# Patient Record
Sex: MALE
[De-identification: names, ages, dates, MRNs, and addresses within clinical notes are randomized per-mention and may not be internally consistent; named-entity substitution may affect disease eponyms.]

---

## 2024-02-02 PROBLEM — Z00.00 ENCOUNTER FOR PREVENTIVE HEALTH EXAMINATION: Status: ACTIVE | Noted: 2024-02-02

## 2024-02-09 ENCOUNTER — NON-APPOINTMENT (OUTPATIENT)
Age: 84
End: 2024-02-09

## 2024-02-09 ENCOUNTER — APPOINTMENT (OUTPATIENT)
Dept: NEUROSURGERY | Facility: CLINIC | Age: 84
End: 2024-02-09
Payer: MEDICARE

## 2024-02-09 VITALS
BODY MASS INDEX: 23.95 KG/M2 | HEART RATE: 67 BPM | SYSTOLIC BLOOD PRESSURE: 135 MMHG | HEIGHT: 66 IN | DIASTOLIC BLOOD PRESSURE: 71 MMHG | OXYGEN SATURATION: 100 % | WEIGHT: 149 LBS

## 2024-02-09 DIAGNOSIS — Z86.39 PERSONAL HISTORY OF OTHER ENDOCRINE, NUTRITIONAL AND METABOLIC DISEASE: ICD-10-CM

## 2024-02-09 DIAGNOSIS — Z86.69 PERSONAL HISTORY OF OTHER DISEASES OF THE NERVOUS SYSTEM AND SENSE ORGANS: ICD-10-CM

## 2024-02-09 DIAGNOSIS — Z86.79 PERSONAL HISTORY OF OTHER DISEASES OF THE CIRCULATORY SYSTEM: ICD-10-CM

## 2024-02-09 DIAGNOSIS — H02.401 UNSPECIFIED PTOSIS OF RIGHT EYELID: ICD-10-CM

## 2024-02-09 DIAGNOSIS — R26.9 UNSPECIFIED ABNORMALITIES OF GAIT AND MOBILITY: ICD-10-CM

## 2024-02-09 DIAGNOSIS — Z78.9 OTHER SPECIFIED HEALTH STATUS: ICD-10-CM

## 2024-02-09 PROCEDURE — 99204 OFFICE O/P NEW MOD 45 MIN: CPT

## 2024-02-09 RX ORDER — METFORMIN HYDROCHLORIDE 625 MG/1
TABLET ORAL
Refills: 0 | Status: ACTIVE | COMMUNITY

## 2024-02-09 RX ORDER — AMLODIPINE BESYLATE 5 MG/1
TABLET ORAL
Refills: 0 | Status: ACTIVE | COMMUNITY

## 2024-02-15 NOTE — REASON FOR VISIT
[New Patient Visit] : a new patient visit [Formal Caregiver] : formal caregiver [FreeTextEntry1] : droopy eye lids/ gait imbalance

## 2024-02-15 NOTE — REVIEW OF SYSTEMS
[Leg Weakness] : leg weakness [Eyesight Problems] : eyesight problems [Joint Pain] : joint pain [Negative] : Respiratory [Limb Pain] : no limb pain

## 2024-02-15 NOTE — RESULTS/DATA
[FreeTextEntry1] : Date of Exam: 12-   EXAM:  MRI BRAIN WITHOUT AND WITH CONTRAST  HISTORY:  Memory loss. Evaluate for cerebrovascular disease.  TECHNIQUE: Magnetic resonance imaging was performed with axial T1-weighted images, axial and sagittal FLAIR images, axial fast spin-echo T2-weighted images, diffusion weighted axial images, gradient echo axial images, and postcontrast axial and coronal T1-weighted images on a 3T MR unit.  IV Contrast:  15 ml of Clariscan was injected from a 20 ml single use vial.  COMPARISON:  None available.  FINDINGS: There is no evidence of abnormal intra-axial or extra-axial enhancement.  There is no restricted diffusion, blood degradation products, mass lesion or extra-axial fluid collection.  There are multiple small nonspecific FLAIR hyperintensities scattered in the subcortical, deep white matter of bilateral cerebral hemispheres most consistent with mild chronic microvascular ischemia, and differential diagnosis of sequela of infectious and inflammatory process. There is an unexpanded partially empty sella.  There is mild ventriculomegaly of the lateral and third ventricles, without obstructive hydrocephalus. 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  Flow-voids are identified in the internal carotid and basilar arteries consistent with their patency.  The visualized mastoid air cells, nasopharynx and orbits are unremarkable, and right ocular lens implant.  The paranasal sinuses are unremarkable.  IMPRESSION:  No acute intracranial pathology or abnormal enhancement.  Probable mild chronic microvascular ischemia of the cerebral white matter.  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   Thank you for the opportunity to participate in the care of this patient.     ALICE ROWELL MD  - Electronically Signed: 12- 11:17 AM  Physician to Physician Direct Line is:

## 2024-02-15 NOTE — CONSULT LETTER
[Dear  ___] : Dear  [unfilled], [( Thank you for referring [unfilled] for consultation for _____ )] : Thank you for referring [unfilled] for consultation for [unfilled] [Consult Closing:] : Thank you very much for allowing me to participate in the care of this patient.  If you have any questions, please do not hesitate to contact me. [Sincerely,] : Sincerely, [FreeTextEntry2] : Luis Enrique Scott MD [address], [address] [place] [address] [FreeTextEntry1] : Please see my note below for further details. [FreeTextEntry3] : Young Nieves MD, FACS Department of Neurosurgery [phone]

## 2024-02-15 NOTE — HISTORY OF PRESENT ILLNESS
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 Scott. Pt states that he has been walking abnormally after an accident few years ago and  thinks he slightly worse with his walk due to his knee pain. He also  think the reason might be due to his right droopy eyes and the vision disturbance. He consulted Neurologist Dr. 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   Today patient presents to our office ambulating independently states that his walking is the same from few years. He is concerned about the Droopy eye lids now and thinks his vision is worse. His health Aid thinks he has vision deficit as he hits the Furniture and wall. Pt and Aid agrees that his memory/concentration has not changed. Denies headaches or urinary incontinence.  TUG test today is 13

## 2024-02-15 NOTE — PHYSICAL EXAM
[General Appearance - Alert] : alert [General Appearance - In No Acute Distress] : in no acute distress [General Appearance - Well Nourished] : well nourished [General Appearance - Well-Appearing] : healthy appearing [Oriented To Time, Place, And Person] : oriented to person, place, and time [Affect] : the affect was normal [Memory Recent] : recent memory was not impaired [Person] : oriented to person [Place] : oriented to place [Time] : oriented to time [Short Term Intact] : short term memory intact [Cranial Nerves Optic (II)] : visual acuity intact bilaterally,  pupils equal round and reactive to light [Cranial Nerves Oculomotor (III)] : extraocular motion intact [Cranial Nerves Trigeminal (V)] : facial sensation intact symmetrically [Cranial Nerves Facial (VII)] : face symmetrical [Cranial Nerves Vestibulocochlear (VIII)] : hearing was intact bilaterally [Cranial Nerves Accessory (XI - Cranial And Spinal)] : head turning and shoulder shrug symmetric [Cranial Nerves Hypoglossal (XII)] : there was no tongue deviation with protrusion [Motor Tone] : muscle tone was normal in all four extremities [Motor Strength] : muscle strength was normal in all four extremities [Sclera] : the sclera and conjunctiva were normal [PERRL With Normal Accommodation] : pupils were equal in size, round, reactive to light, with normal accommodation [Outer Ear] : the ears and nose were normal in appearance [Both Tympanic Membranes Were Examined] : both tympanic membranes were normal [Neck Appearance] : the appearance of the neck was normal [] : no respiratory distress [Respiration, Rhythm And Depth] : normal respiratory rhythm and effort [Heart Rate And Rhythm] : heart rate was normal and rhythm regular [No Spinal Tenderness] : no spinal tenderness [Involuntary Movements] : no involuntary movements were seen [FreeTextEntry8] : no drift, walking slowly , take two steps at turns

## 2024-02-15 NOTE — ASSESSMENT
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 Scott. Pt has been walking abnormally after an accident few years ago, now worsened with knee pain.  However Neurologist Dr. 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 Pt denies any  headaches or urinary incontinence. Neurologically\y intact TUG test today is 13  PLAN: MRI Head with CSF flow PT for back for strengthening and modalities , 2-3 times/week x 8 weeks.  Referred to neuro ophthalmology for visual evaluation F/U in 2 months

## 2024-02-19 ENCOUNTER — APPOINTMENT (OUTPATIENT)
Dept: MRI IMAGING | Facility: CLINIC | Age: 84
End: 2024-02-19

## 2024-03-08 ENCOUNTER — APPOINTMENT (OUTPATIENT)
Dept: MRI IMAGING | Facility: CLINIC | Age: 84
End: 2024-03-08

## 2024-03-22 ENCOUNTER — APPOINTMENT (OUTPATIENT)
Dept: NEUROSURGERY | Facility: CLINIC | Age: 84
End: 2024-03-22

## 2024-08-26 ENCOUNTER — APPOINTMENT (OUTPATIENT)
Dept: SURGERY | Facility: CLINIC | Age: 84
End: 2024-08-26
Payer: MEDICARE

## 2024-08-26 VITALS
SYSTOLIC BLOOD PRESSURE: 158 MMHG | BODY MASS INDEX: 22.5 KG/M2 | DIASTOLIC BLOOD PRESSURE: 69 MMHG | WEIGHT: 140 LBS | OXYGEN SATURATION: 100 % | HEIGHT: 66 IN | HEART RATE: 60 BPM

## 2024-08-26 DIAGNOSIS — Z82.49 FAMILY HISTORY OF ISCHEMIC HEART DISEASE AND OTHER DISEASES OF THE CIRCULATORY SYSTEM: ICD-10-CM

## 2024-08-26 DIAGNOSIS — Z86.79 PERSONAL HISTORY OF OTHER DISEASES OF THE CIRCULATORY SYSTEM: ICD-10-CM

## 2024-08-26 DIAGNOSIS — Z63.4 DISAPPEARANCE AND DEATH OF FAMILY MEMBER: ICD-10-CM

## 2024-08-26 DIAGNOSIS — Z78.9 OTHER SPECIFIED HEALTH STATUS: ICD-10-CM

## 2024-08-26 DIAGNOSIS — Z86.69 PERSONAL HISTORY OF OTHER DISEASES OF THE NERVOUS SYSTEM AND SENSE ORGANS: ICD-10-CM

## 2024-08-26 DIAGNOSIS — R10.11 RIGHT UPPER QUADRANT PAIN: ICD-10-CM

## 2024-08-26 DIAGNOSIS — Z86.39 PERSONAL HISTORY OF OTHER ENDOCRINE, NUTRITIONAL AND METABOLIC DISEASE: ICD-10-CM

## 2024-08-26 PROCEDURE — 99203 OFFICE O/P NEW LOW 30 MIN: CPT

## 2024-08-26 RX ORDER — PANTOPRAZOLE 40 MG/1
TABLET, DELAYED RELEASE ORAL
Refills: 0 | Status: ACTIVE | COMMUNITY

## 2024-08-26 SDOH — SOCIAL STABILITY - SOCIAL INSECURITY: DISSAPEARANCE AND DEATH OF FAMILY MEMBER: Z63.4

## 2024-08-26 NOTE — ASSESSMENT
[FreeTextEntry1] : Impression: right upper quadrant pain . CT showed A 3 mm stone or wall calcification  in the gallbladder-  results are not conclusive

## 2024-08-26 NOTE — PLAN
[FreeTextEntry1] : Mr. WHITE  was told significance of findings, options, risks and benefits were explained.    All surgical options were discussed including non-surgical treatments.  he was advised to have an abdominal US and return after the test. Patient advised to seek immediate medical attention with any acute change in symptoms or with the development of any new or worsening symptoms.  Patient's questions and concerns addressed to patient's satisfaction, and patient verbalized an understanding of the information discussed.

## 2024-08-26 NOTE — PHYSICAL EXAM
[Abdominal Masses] : No abdominal masses [Abdomen Tenderness] : ~T ~M No abdominal tenderness [Alert] : alert [Oriented to Person] : oriented to person [Oriented to Place] : oriented to place [Oriented to Time] : oriented to time [Calm] : calm [de-identified] : He  is alert, well-groomed, and in NAD   [de-identified] : anicteric.  Nasal mucosa pink, septum midline. Oral mucosa pink.  Tongue midline, Pharynx without exudates.

## 2024-08-26 NOTE — CONSULT LETTER
[Dear  ___] : Dear  [unfilled], [Consult Letter:] : I had the pleasure of evaluating your patient, [unfilled]. [Please see my note below.] : Please see my note below. [Consult Closing:] : Thank you very much for allowing me to participate in the care of this patient.  If you have any questions, please do not hesitate to contact me. [Sincerely,] : Sincerely, [FreeTextEntry3] : Ward Curtis MD, FACS

## 2024-08-26 NOTE — HISTORY OF PRESENT ILLNESS
[de-identified] : Mr. GABRIELLE WHITE is a 83 year  old patient who was referred by Dr. Janay Ross with the chief complaint of having right upper quadrant and epigastric pain on and off  for  1 month. Pain is non-radiating.    He reports no nausea or vomiting and no history of jaundice, acholia or acholuria.   Appetite is good and weight is stable.   He   has no family history of biliary tract disease. patient reports being admitted to [facility] ED  on 07/22/2024 with severe abdominal pain.   Mr. WHITE  is s/p CT scan abdomen and pelvis that showed A 3 mm stone or wall calcification is present in the gallbladder.

## 2024-08-26 NOTE — DATA REVIEWED
[FreeTextEntry1] :   CAT scan of abdomen and pelvis with contrast.  CLINICAL INFORMATION: abdominal pain Abdominal pain, acute, nonlocalized   TECHNIQUE: CAT scan was performed with contiguous axial images from the diaphragm to pubic symphysis with IV contrast. No oral contrast administered. Sagittal and coronal reformatted images were obtained.  All CT scans at this facility use one or more dose reduction techniques, viz.: automated exposure control; ma/kV adjustment per patient size (including targeted exams where dose is matched to indication; i.e. head); or iterative reconstruction technique.   FINDINGS:   LOWER CHEST: Small amount of atelectasis/infiltrate is present in the posterior subpleural region of the lung bases.  HEPATOBILIARY: A 3 mm stone or wall calcification is present in the gallbladder. No gallbladder inflammation present.  No biliary ductal dilatation present.  Liver appears normal.  SPLEEN/PANCREAS: Spleen and pancreas appear normal.  KIDNEYS/ADRENAL GLANDS: Mild fullness versus 2.4 x 0.9 cm parapelvic cyst is present in the upper pole pelvis of the left kidney.  The right kidney and both adrenal glands appear normal.  Bilateral ureters are normal size.  There is a 2 mm nonobstructing stone in the left renal pelvis.  GASTROINTESTINAL/PERITONEUM: Stomach is partially collapsed, limiting assessment.  Small bowel, colon, and rectum appear normal. Appendix is not visualized. No pericecal inflammation present.  VASCULAR/RETROPERITONEUM: There is a small amount of calcification in the wall of the normal-sized abdominal aorta.  The inferior vena cava appears normal.  PELVIS: Urinary bladder, prostate gland, and seminal vesicles appear normal.  BONES/SOFT TISSUES: Bones appear intact. Abdominal soft tissues appear normal.   IMPRESSION: A 3 mm stone or wall calcification is present in the gallbladder. No gallbladder inflammation present.  Mild fullness versus 2.4 x 0.9 cm parapelvic cyst is present in the upper pole pelvis of the left kidney. There is a 2 mm nonobstructing stone in the left renal pelvis.  Small amount of atelectasis/infiltrate is present in the posterior subpleural region of the lung bases.      FOR REFERRING DOCTOR: Any questions or concerns about this report may be addressed to me at [phone] or [email or web address]

## 2024-09-22 ENCOUNTER — NON-APPOINTMENT (OUTPATIENT)
Age: 84
End: 2024-09-22

## 2024-09-23 ENCOUNTER — APPOINTMENT (OUTPATIENT)
Dept: SURGERY | Facility: CLINIC | Age: 84
End: 2024-09-23
Payer: MEDICARE

## 2024-09-23 VITALS
WEIGHT: 141 LBS | HEIGHT: 66 IN | SYSTOLIC BLOOD PRESSURE: 162 MMHG | OXYGEN SATURATION: 98 % | HEART RATE: 78 BPM | DIASTOLIC BLOOD PRESSURE: 81 MMHG | BODY MASS INDEX: 22.66 KG/M2

## 2024-09-23 DIAGNOSIS — R10.11 RIGHT UPPER QUADRANT PAIN: ICD-10-CM

## 2024-09-23 PROCEDURE — 99213 OFFICE O/P EST LOW 20 MIN: CPT

## 2024-09-23 NOTE — DATA REVIEWED
[FreeTextEntry1] :  Exam requested by: NOE JORDAN MD [address] [facility], 5TH FLOOR [address] [facility] [address] SITE PERFORMED: [place] PHONE: [phone] Patient: JAZZ GLASGOW YOB: 1940 Phone: [phone] MRN: [id number] Acc: [id number] Date of Exam: 09-   EXAM:  ULTRASOUND ABDOMEN COMPLETE  HISTORY:  Male, 83 years-old with right upper quadrant pain  TECHNIQUE:  Using real-time ultrasonography with a high-resolution broadband phased-array curved transducer, multiplanar gray scale images were obtained and supplemented with color Doppler. Static images are provided for review.  COMPARISON:  Ultrasound abdomen complete dated 2/13/2024.  FINDINGS:   Abdominal Aorta/IVC: No evidence of abdominal aortic aneurysm. Visualized portions of the IVC were patent.  Liver:  Normal in size, morphology and contour. The visualized portion of portal and hepatic veins are unremarkable. No intrahepatic biliary duct dilatation. The liver has normal echogenicity. The right hepatic lobe measures 13.2 cm.  Gallbladder: The gallbladder demonstrates no evidence of any stones or sludge. The gallbladder wall is of normal thickness. No pericholecystic fluid is identified.  Bile Duct: Normal measuring 0.6 cm diameter at the malcom hepatis.  Pancreas: The visualized portion of the body of the pancreas is within normal limits. The head and tail are obscured by overlying bowel gas. No pancreatic duct dilatation.  Kidneys: Normal in size, morphology. There is no suspicious renal lesion. No hydronephrosis, shadowing calculi or perinephric fluid. Right Kidney: 10.3 x 4.3 x 5.1 cm. The echotexture of the right kidney is increased. Left Kidney: 10.8 x 4.8 x 5.1 cm. The echotexture of the left kidney is increased. A left mid pole nonobstructing renal calculus measures 0.4 x 0.4 cm. This is unchanged.  Spleen: Normal size, contour and echotexture measuring 9.0 x 3.6 x 2.9 cm.  No abdominal free fluid.  IMPRESSION:  1. Minimally echogenic kidneys bilaterally. This is consistent with underlying medical renal disease. No interval change is noted when compared to the previous study of 2/13/2021. 2. Suspicion of a left mid pole nonobstructing renal calculus measuring 0.4 x 0.4 cm. 3. Normal liver and gallbladder. 4. The visualized pancreatic body is within normal limits. The remainder of the pancreas is obscured by the overlying bowel gas. 5. Overall, no significant change when compared to the previous study of 2/13/2024.   Thank you for the opportunity to participate in the care of this patient.     SHAWANDA PARR MD  - Electronically Signed: 09- 3:40 PM  Physician to Physician Direct Line is: [phone]

## 2024-09-23 NOTE — HISTORY OF PRESENT ILLNESS
[de-identified] : This is  a 83 year   old patient presenting today  to discuss the results of his recent   imaging.    was referred by Dr. Janay Ross with the chief complaint of having right upper quadrant and epigastric pain on and off for 1 month.    patient reports being admitted to [facility] ED on 07/22/2024 with severe abdominal pain. Mr. WHITE is s/p CT scan abdomen and pelvis that showed A 3 mm stone or wall calcification is present in the gallbladder. Mr. WHITE  is s/p US abdomen on 09/12/2024  that showed  minimally echogenic kidneys bilaterally. This is consistent with underlying medical renal disease. No interval change is noted when compared to the previous study of 2/13/2021.  Normal liver and gallbladder. Patient reports no interval changes to her overall health status or medical history . he reports epigastric pain.  he states that he was told he has a gastritis.  Last EGD was in January 2020.

## 2024-09-23 NOTE — PHYSICAL EXAM
[Abdominal Masses] : No abdominal masses [Abdomen Tenderness] : ~T ~M No abdominal tenderness [Alert] : alert [Oriented to Person] : oriented to person [Oriented to Place] : oriented to place [Oriented to Time] : oriented to time [Calm] : calm [de-identified] : He  is alert, well-groomed, and in NAD   [de-identified] : anicteric.  Nasal mucosa pink, septum midline. Oral mucosa pink.  Tongue midline, Pharynx without exudates.

## 2024-09-23 NOTE — PLAN
[FreeTextEntry1] : Mr. WHITE will follow up  if needed. Warning signs, follow up, and restrictions were discussed with the patient.

## 2024-09-23 NOTE — HISTORY OF PRESENT ILLNESS
[de-identified] : This is  a 83 year   old patient presenting today  to discuss the results of his recent   imaging.    was referred by Dr. Janay Ross with the chief complaint of having right upper quadrant and epigastric pain on and off for 1 month.    patient reports being admitted to [facility] ED on 07/22/2024 with severe abdominal pain. Mr. WHITE is s/p CT scan abdomen and pelvis that showed A 3 mm stone or wall calcification is present in the gallbladder. Mr. WHITE  is s/p US abdomen on 09/12/2024  that showed  minimally echogenic kidneys bilaterally. This is consistent with underlying medical renal disease. No interval change is noted when compared to the previous study of 2/13/2021.  Normal liver and gallbladder. Patient reports no interval changes to her overall health status or medical history . he reports epigastric pain.  he states that he was told he has a gastritis.  Last EGD was in January 2020.

## 2024-09-23 NOTE — DATA REVIEWED
[FreeTextEntry1] :  Exam requested by: NOE JORDAN MD [address] [facility], 5TH FLOOR [address] [facility] [address] SITE PERFORMED: [place]ITE PHONE: [phone] Patient: JAZZ GLASGOW YOB: 1940 Phone: [phone] MRN: [id number] Acc: [id number] Date of Exam: 09-   EXAM:  ULTRASOUND ABDOMEN COMPLETE  HISTORY:  Male, 83 years-old with right upper quadrant pain  TECHNIQUE:  Using real-time ultrasonography with a high-resolution broadband phased-array curved transducer, multiplanar gray scale images were obtained and supplemented with color Doppler. Static images are provided for review.  COMPARISON:  Ultrasound abdomen complete dated 2/13/2024.  FINDINGS:   Abdominal Aorta/IVC: No evidence of abdominal aortic aneurysm. Visualized portions of the IVC were patent.  Liver:  Normal in size, morphology and contour. The visualized portion of portal and hepatic veins are unremarkable. No intrahepatic biliary duct dilatation. The liver has normal echogenicity. The right hepatic lobe measures 13.2 cm.  Gallbladder: The gallbladder demonstrates no evidence of any stones or sludge. The gallbladder wall is of normal thickness. No pericholecystic fluid is identified.  Bile Duct: Normal measuring 0.6 cm diameter at the malcom hepatis.  Pancreas: The visualized portion of the body of the pancreas is within normal limits. The head and tail are obscured by overlying bowel gas. No pancreatic duct dilatation.  Kidneys: Normal in size, morphology. There is no suspicious renal lesion. No hydronephrosis, shadowing calculi or perinephric fluid. Right Kidney: 10.3 x 4.3 x 5.1 cm. The echotexture of the right kidney is increased. Left Kidney: 10.8 x 4.8 x 5.1 cm. The echotexture of the left kidney is increased. A left mid pole nonobstructing renal calculus measures 0.4 x 0.4 cm. This is unchanged.  Spleen: Normal size, contour and echotexture measuring 9.0 x 3.6 x 2.9 cm.  No abdominal free fluid.  IMPRESSION:  1. Minimally echogenic kidneys bilaterally. This is consistent with underlying medical renal disease. No interval change is noted when compared to the previous study of 2/13/2021. 2. Suspicion of a left mid pole nonobstructing renal calculus measuring 0.4 x 0.4 cm. 3. Normal liver and gallbladder. 4. The visualized pancreatic body is within normal limits. The remainder of the pancreas is obscured by the overlying bowel gas. 5. Overall, no significant change when compared to the previous study of 2/13/2024.   Thank you for the opportunity to participate in the care of this patient.     SHAWANDA PARR MD  - Electronically Signed: 09- 3:40 PM  Physician to Physician Direct Line is: [phone]

## 2024-09-23 NOTE — PHYSICAL EXAM
[Abdominal Masses] : No abdominal masses [Abdomen Tenderness] : ~T ~M No abdominal tenderness [Alert] : alert [Oriented to Person] : oriented to person [Oriented to Place] : oriented to place [Oriented to Time] : oriented to time [Calm] : calm [de-identified] : He  is alert, well-groomed, and in NAD   [de-identified] : anicteric.  Nasal mucosa pink, septum midline. Oral mucosa pink.  Tongue midline, Pharynx without exudates.

## 2024-09-23 NOTE — ASSESSMENT
[FreeTextEntry1] : Impression: epigastric pain- CT showed no evidence of GB stones.  he was advised to follow up with his GI specialist